# Patient Record
Sex: FEMALE | Race: WHITE | NOT HISPANIC OR LATINO | URBAN - METROPOLITAN AREA
[De-identification: names, ages, dates, MRNs, and addresses within clinical notes are randomized per-mention and may not be internally consistent; named-entity substitution may affect disease eponyms.]

---

## 2017-07-05 ENCOUNTER — TELEPHONE (OUTPATIENT)
Dept: TRANSPLANT | Facility: CLINIC | Age: 32
End: 2017-07-05

## 2017-07-05 NOTE — TELEPHONE ENCOUNTER
"Living Kidney Donor Evaluation Completed: 2017 23:32:25 CT Updated: 2017 23:33:09 CT  Donor Name: Magi Ewing MRN: [id number] Note: : 1985 Age: 31Gender: Female Donor Height: 5  5\" Weight (lb): 145 BMI: 24.1  Donor Race:  Ethnicity: Not / Donor Preferred Language: English  Required?: No Current Marital Status:   Demographics: Home Address: [address] [place] [address] City: [place] State: [place] Zip: [address] Country: [place]  Best Phone: [phone] Alt Phone: Donor Email: [email or web address] Best Phone Type: Home Alt Phone Type:   Preferred Contact Time(s): 09:00 AM-11:00 AM, 11:00 AM-1:00 PM Preferred Contact Day(s):   Donor Screen: PASSED Donor Referred by: Social Media Donor self reported ABO: O  Recipient Information: Recipient Name (Last, First): ALTRUISTIC,  ALTRUISTIC Recipient :    ... Donor Relationship: N/A Recipient Diagnosis: Recipient ABO:   MEDICAL HISTORY:  Depression  Endometriosis  History of miscarriage  History of pregnancy  Ovarian Cysts  MEDICATIONS:  None Reported  SURGICAL HISTORY:  Ovarian Cystectomy  Tubal Ligation  ALLERGIES:  Iodine : ANAPHYLAXIS, Rash, Nausea and/or Vomiting, Wheezing and/or Bronchospasm, Edema  Shellfish : ANAPHYLAXIS, Rash, Wheezing and/or Bronchospasm, Edema  Tape : other (blistering of skin)  SOCIAL HISTORY:  EtOH: Rare (1-2 drinks/year)  Illicit Drug Use: Current: Marijuana  Tobacco: Current (1/2 ppd x 20 years)  SELF-REPORTED FUNCTIONAL STATUS:  \"I am able to participate in strenuous sports such as swimming, singles tennis, football, basketball, or skiing\"  Exercise (3 X per week)  REVIEW OF ORGAN SYSTEMS: Airway or Lungs: No Blood Disorder: No Cancer: No Diabetes,Thyroid,Adrenal,Endocrine Disorder: No Digestive or Liver: No Female Health: Yes Heart or Circulatory System: No Immune Diseases: No Kidneys and Bladder: No Muscles,Bones,Joints: No Neuro: No Psych: Yes  FAMILY HISTORY: " Confirmed:  Denied:  Cancer (denies)  Diabetes (denies)  Heart Disease (denies)  Hypertension (denies)  Kidney Disease (denies)  Kidney Stones (denies)  DONOR INFORMATION:  Level of Education: High school or secondary school degree complete Employment Status: Full Time Employer: [employer] Medical Insurance Status: No medical insurance Current Accommodation: Owns own home/apartment Living Arrangement: With spouse Allow Disclosure to Recipient: Yes Paired Kidney Exchange Education Level: General idea Paired Kidney Exchange Participation Consent: Yes, for a better match Donor Motivation: Highly motivated donor  HIGH RISK BEHAVIOR:  Blood transfusion < 12 months. (NO)  Commercial sex < 12 months. (NO)  Illicit IV drug use < 5yrs. (NO)  Other high risk sexual contact < 12 months. (NO)  EMERGENCY CONTACT INFORMATION:  Primary Secondary First Name: Nikolay Last Name: Gildardo Phone Number: [phone] Relationship: Spouse  First Name: Cassie  Last Name: Angus Phone Number: [phone] Relationship: Grandparent  REASON FOR DONATION:    i would like to help someone in need.  PHYSICIAN CONTACT INFORMATION:  PCP  Name:    City: State:   Phone:   ADDITIONAL NOTES:

## 2017-07-06 NOTE — TELEPHONE ENCOUNTER
Magi is from [facility] and wants to be a NDD. Left message for Magi asking if she realized she was doing questionaire for [place]. Suggested local [facility] for her. If wishes to cont process here she may. To contact us either way.No pkt sent.